# Patient Record
Sex: MALE | Race: OTHER | HISPANIC OR LATINO
[De-identification: names, ages, dates, MRNs, and addresses within clinical notes are randomized per-mention and may not be internally consistent; named-entity substitution may affect disease eponyms.]

---

## 2022-12-05 PROBLEM — Z00.00 ENCOUNTER FOR PREVENTIVE HEALTH EXAMINATION: Status: ACTIVE | Noted: 2022-12-05

## 2022-12-20 ENCOUNTER — APPOINTMENT (OUTPATIENT)
Dept: PAIN MANAGEMENT | Facility: CLINIC | Age: 67
End: 2022-12-20

## 2022-12-20 VITALS — WEIGHT: 225 LBS | BODY MASS INDEX: 35.31 KG/M2 | HEIGHT: 67 IN

## 2022-12-20 DIAGNOSIS — Z86.79 PERSONAL HISTORY OF OTHER DISEASES OF THE CIRCULATORY SYSTEM: ICD-10-CM

## 2022-12-20 DIAGNOSIS — Z78.9 OTHER SPECIFIED HEALTH STATUS: ICD-10-CM

## 2022-12-20 DIAGNOSIS — Z86.39 PERSONAL HISTORY OF OTHER ENDOCRINE, NUTRITIONAL AND METABOLIC DISEASE: ICD-10-CM

## 2022-12-20 DIAGNOSIS — Z87.891 PERSONAL HISTORY OF NICOTINE DEPENDENCE: ICD-10-CM

## 2022-12-20 PROCEDURE — 99072 ADDL SUPL MATRL&STAF TM PHE: CPT

## 2022-12-20 PROCEDURE — 96102W: CUSTOM

## 2022-12-20 PROCEDURE — 99214 OFFICE O/P EST MOD 30 MIN: CPT

## 2022-12-21 PROBLEM — Z86.39 HISTORY OF THYROID DISORDER: Status: RESOLVED | Noted: 2022-12-21 | Resolved: 2022-12-21

## 2022-12-21 PROBLEM — Z86.79 HISTORY OF HYPERTENSION: Status: RESOLVED | Noted: 2022-12-21 | Resolved: 2022-12-21

## 2022-12-21 PROBLEM — Z78.9 CURRENT NON-DRINKER OF ALCOHOL: Status: ACTIVE | Noted: 2022-12-21

## 2022-12-21 PROBLEM — Z78.9 DOES NOT USE ILLICIT DRUGS: Status: ACTIVE | Noted: 2022-12-21

## 2022-12-21 PROBLEM — Z87.891 FORMER SMOKER: Status: ACTIVE | Noted: 2022-12-21

## 2022-12-21 NOTE — ASSESSMENT
[FreeTextEntry1] : This is a 67 year old male presenting with lower back pain. Patient states that he is having severe back pain following a physical exam with another provider. I will send Ibuprofen-Famotidine to his pharmacy at his request. He will follow up in 6 weeks for reevaluation. All this patients questions were answered and the conversation was understood well.\par \par I, Nadine Dillard, attest that this documentation has been prepared under the direction and in the presence of Provider Mana Retana MD.\par \par \par Thank you for allowing me to assist in the management of this patient. \par \par \par Best Regards, \par \par \par Mana Retana M.D., FAAPMR\par \par \par Diplomate, American Board of Physical Medicine and Rehabilitation\par Diplomate, American Board of Pain Medicine \par Diplomate, American Board of Pain Management\par

## 2022-12-21 NOTE — WORK
[Other: ___] : [unfilled] [Was the competent medical cause of the injury] : was the competent medical cause of the injury [Are consistent with the injury] : are consistent with the injury [Consistent with my objective findings] : consistent with my objective findings [Moderate Partial] : moderate partial [Can return to work with limitations on ______] : can return to work with limitations on [unfilled] [Bending/Twisting] : bending/twisting [Climbing stairs/Ladders] : climbing stairs/ladders [Kneeling] : kneeling [Lifting] : lifting [Operating heavy equipment] : operating heavy equipment [Walking] : walking [Pulling/Pushing] : pulling/pushing [Reaching overhead] : reaching overhead [Unknown at this time] : : unknown at this time [Patient] : patient [No] : No [No Rx restrictions] : No Rx restrictions. [I provided the services listed above] :  I provided the services listed above. [FreeTextEntry1] : Fair [Sedentary Work:] : Sedentary Work: Exerting up to 10 pounds of force occasionally and/or a negligible amount of force frequently to lift, carry, push, pull or otherwise move objects, including the human body. Sedentary work involves sitting most of the time, but may involve walking or standing for brief periods of time. Jobs are sedentary if walking and standing are required only occasionally and all other sedentary criteria are met.

## 2022-12-21 NOTE — PHYSICAL EXAM
[Normal Coordination] : normal coordination [Normal DTR UE/LE] : normal DTR UE/LE  [Normal Sensation] : normal sensation [Normal Mood and Affect] : normal mood and affect [Orientated] : orientated [Able to Communicate] : able to communicate [Well Developed] : well developed [Well Nourished] : well nourished [FreeTextEntry9] : positive left facet loading and positive right facet loading.  [] : patient ambulates without assistive device [TWNoteComboBox7] : forward flexion 45 degrees [de-identified] : extension 20 degrees

## 2022-12-21 NOTE — DATA REVIEWED
[FreeTextEntry1] : SOAPP: low on 12/20/22\par Low risk: Patient has combination of a low risk SOAP and no risk factors. UDS would be repeated randomly every quarter.\par \par UDS: No data obtained today.\par \par [place] REGISTRY: Checked\par

## 2022-12-21 NOTE — HISTORY OF PRESENT ILLNESS
[FreeTextEntry1] : ORIGINAL PRESENTATION: Mr. Flores is a 67 year-old male complaining of neck back pain. Pain is precipitated by work related injury occurring on 02/09/2020. 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 He went to [facility] across the street from his work site where he was prescribed Naproxen and released. No imaging was obtained at that time. He states MRI's of the cervical and lumbar spine were later obtained however, not available for review today. He has been under the care of Dr. Chappell his neurosurgeon who has suggested surgical intervention however, patient is not ready to proceed with surgery at this time. With regards to the neck, pain is mainly localized, right greater than left. He notes difficulty sleeping at night due to extreme discomfort. With regards to the back, pain is localized. He denies any radicular features to the upper lower extremities, numbness tingling or upper lower extremity weakness. He has been attending physical therapy which did improve his symptoms however, his workers compensation insurance would not approve additional sessions. We discussed trialing epidural injections as an alternative however, he would like to remain conservative at this time and re-trial physical therapy. He has been managing with Naproxen with relief, but states it causes some gastric upset. Of note, he has not returned to work since the injury occurred and does not feel able to return at this time as his job is very physically demanding. Pain started after injury at work. The patient has had this pain for 14 months. Patient describes pain as moderate to severe, 8/10 at its worst, 7/10 currently, 6/10 at its best. During the last month the pain has been nearly constant with symptoms worsening in no typical pattern. Pain described as sharp, pressure-like, dull/aching. Pain is increased with back pain is increased with standing, walking, exercise. Pain is decreased with back pain is decreased with lying down, sitting. Pain is not changed with relaxation, bowel movements. Bowel or bladder habits have not changed.  \par \par ACTIVITIES: Patient could walk 10 blocks before the pain starts. Patient can sit 1 hour before pain starts. Patient can stand 30 minutes before pain starts. The patient sometimes lies down because of pain. Patient uses no assistive device at this time. Patient has difficulty going to work, performing household chores, doing outside work or shopping, socializing with friends, participating in recreational activities at this time.  \par \par PRIOR PAIN TREATMENTS: Moderate relief with physical therapy and heat treatment.  \par \par PRIOR PAIN MEDICATIONS: Tylenol, naproxen. \par \par PATIENT PRESENTS FOR FOLLOW UP:  Patient was last seen on 6/10/22 for cervical and lumbar pain. Since his last visit, he had a physical with an MIKE on 12/08/22 and states that they lifted his leg causing severe pain in his lower back pain. He was unable to walk or sit for long periods of time. He would like to continue with medication management since it provided him relief in the past. He would like to trial Ibuprofen-Famotidine over the Naproxen since it provided him better relief. \par \par

## 2022-12-21 NOTE — REASON FOR VISIT
[Follow-Up Visit] : a follow-up pain management visit [FreeTextEntry2] : refill/ having pain  neck and back

## 2023-01-08 ENCOUNTER — FORM ENCOUNTER (OUTPATIENT)
Age: 68
End: 2023-01-08

## 2023-02-03 ENCOUNTER — APPOINTMENT (OUTPATIENT)
Dept: PAIN MANAGEMENT | Facility: CLINIC | Age: 68
End: 2023-02-03
Payer: OTHER MISCELLANEOUS

## 2023-02-03 VITALS
SYSTOLIC BLOOD PRESSURE: 123 MMHG | HEIGHT: 67 IN | WEIGHT: 225 LBS | BODY MASS INDEX: 35.31 KG/M2 | HEART RATE: 85 BPM | DIASTOLIC BLOOD PRESSURE: 79 MMHG

## 2023-02-03 PROCEDURE — 99072 ADDL SUPL MATRL&STAF TM PHE: CPT

## 2023-02-03 PROCEDURE — 99213 OFFICE O/P EST LOW 20 MIN: CPT | Mod: ACP

## 2023-02-03 RX ORDER — NAPROXEN 500 MG/1
500 TABLET, DELAYED RELEASE ORAL
Qty: 28 | Refills: 0 | Status: DISCONTINUED | COMMUNITY
Start: 2022-12-09 | End: 2023-02-03

## 2023-02-03 NOTE — DISCUSSION/SUMMARY
[de-identified] : This is a 67 year old male presenting with cervical and lower back pain. He will continue with Ibuprofen-Famotidine. He will follow up in 2 months for reevaluation. \par \par Clarissa Casanova PA-C \par Mana Retana MD\par

## 2023-02-03 NOTE — HISTORY OF PRESENT ILLNESS
[FreeTextEntry1] : ORIGINAL PRESENTATION: Mr. Flores is a 67 year-old male complaining of neck back pain. Pain is precipitated by work related injury occurring on 02/09/2020. 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 He went to [facility] across the street from his work site where he was prescribed Naproxen and released. No imaging was obtained at that time. He states MRI's of the cervical and lumbar spine were later obtained however, not available for review today. He has been under the care of Dr. Chappell his neurosurgeon who has suggested surgical intervention however, patient is not ready to proceed with surgery at this time. With regards to the neck, pain is mainly localized, right greater than left. He notes difficulty sleeping at night due to extreme discomfort. With regards to the back, pain is localized. He denies any radicular features to the upper lower extremities, numbness tingling or upper lower extremity weakness. He has been attending physical therapy which did improve his symptoms however, his workers compensation insurance would not approve additional sessions. We discussed trialing epidural injections as an alternative however, he would like to remain conservative at this time and re-trial physical therapy. He has been managing with Naproxen with relief, but states it causes some gastric upset. Of note, he has not returned to work since the injury occurred and does not feel able to return at this time as his job is very physically demanding. Pain started after injury at work. The patient has had this pain for 14 months. Patient describes pain as moderate to severe, 8/10 at its worst, 7/10 currently, 6/10 at its best. During the last month the pain has been nearly constant with symptoms worsening in no typical pattern. Pain described as sharp, pressure-like, dull/aching. Pain is increased with back pain is increased with standing, walking, exercise. Pain is decreased with back pain is decreased with lying down, sitting. Pain is not changed with relaxation, bowel movements. Bowel or bladder habits have not changed.  \par \par ACTIVITIES: Patient could walk 10 blocks before the pain starts. Patient can sit 1 hour before pain starts. Patient can stand 30 minutes before pain starts. The patient sometimes lies down because of pain. Patient uses no assistive device at this time. Patient has difficulty going to work, performing household chores, doing outside work or shopping, socializing with friends, participating in recreational activities at this time.  \par \par PRIOR PAIN TREATMENTS: Moderate relief with physical therapy and heat treatment.  \par \par PRIOR PAIN MEDICATIONS: Tylenol, naproxen. \par \par TODAY: He is here for a revisit appointment. He continues with cervical and lumbar pain. He is unable to walk or sit for long periods of time. He cannot drive long distances. He has limited ROM in both his cervical and lumbar spine. He denies upper or lower extremity radiculopathy. PT has helped him in the past; however, additional PT sessions have been denied. He continues with medication management. He is taking Ibuprofen-Famotidine which helps him temporarily.\par \par

## 2023-02-03 NOTE — WORK
[Other: ___] : [unfilled] [Was the competent medical cause of the injury] : was the competent medical cause of the injury [Are consistent with the injury] : are consistent with the injury [Consistent with my objective findings] : consistent with my objective findings [Moderate Partial] : moderate partial [Can return to work with limitations on ______] : can return to work with limitations on [unfilled] [Bending/Twisting] : bending/twisting [Climbing stairs/Ladders] : climbing stairs/ladders [Kneeling] : kneeling [Lifting] : lifting [Operating heavy equipment] : operating heavy equipment [Walking] : walking [Pulling/Pushing] : pulling/pushing [Reaching overhead] : reaching overhead [Unknown at this time] : : unknown at this time [Patient] : patient [No] : No [No Rx restrictions] : No Rx restrictions. [I provided the services listed above] :  I provided the services listed above. [Sedentary Work:] : Sedentary Work: Exerting up to 10 pounds of force occasionally and/or a negligible amount of force frequently to lift, carry, push, pull or otherwise move objects, including the human body. Sedentary work involves sitting most of the time, but may involve walking or standing for brief periods of time. Jobs are sedentary if walking and standing are required only occasionally and all other sedentary criteria are met. [FreeTextEntry1] : Fair

## 2023-02-03 NOTE — PHYSICAL EXAM
[Normal Coordination] : normal coordination [Normal DTR UE/LE] : normal DTR UE/LE  [Normal Sensation] : normal sensation [Normal Mood and Affect] : normal mood and affect [Orientated] : orientated [Able to Communicate] : able to communicate [Well Developed] : well developed [Well Nourished] : well nourished [FreeTextEntry9] : positive left facet loading and positive right facet loading.  [] : no lumbar paraspinal spasm [TWNoteComboBox7] : forward flexion 45 degrees [de-identified] : extension 20 degrees

## 2023-04-19 ENCOUNTER — APPOINTMENT (OUTPATIENT)
Dept: PAIN MANAGEMENT | Facility: CLINIC | Age: 68
End: 2023-04-19
Payer: OTHER MISCELLANEOUS

## 2023-04-19 VITALS — WEIGHT: 218 LBS | HEIGHT: 67 IN | BODY MASS INDEX: 34.21 KG/M2

## 2023-04-19 PROCEDURE — 99213 OFFICE O/P EST LOW 20 MIN: CPT | Mod: ACP

## 2023-04-19 NOTE — REASON FOR VISIT
[Follow-Up Visit] : a follow-up pain management visit [FreeTextEntry2] : FOLLOW UP FOR NECK AND BACK PAIN

## 2023-04-19 NOTE — PHYSICAL EXAM
[Normal Coordination] : normal coordination [Normal DTR UE/LE] : normal DTR UE/LE  [Normal Sensation] : normal sensation [Normal Mood and Affect] : normal mood and affect [Orientated] : orientated [Able to Communicate] : able to communicate [Well Developed] : well developed [Well Nourished] : well nourished [FreeTextEntry9] : positive left facet loading and positive right facet loading.  [] : patient ambulates without assistive device [TWNoteComboBox7] : forward flexion 45 degrees [de-identified] : extension 20 degrees

## 2023-04-19 NOTE — HISTORY OF PRESENT ILLNESS
[FreeTextEntry1] : ORIGINAL PRESENTATION: Mr. Flores is a 67 year-old male complaining of neck back pain. Pain is precipitated by work related injury occurring on 02/09/2020. 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 He went to [facility] across the street from his work site where he was prescribed Naproxen and released. No imaging was obtained at that time. He states MRI's of the cervical and lumbar spine were later obtained however, not available for review today. He has been under the care of Dr. Chappell his neurosurgeon who has suggested surgical intervention however, patient is not ready to proceed with surgery at this time. With regards to the neck, pain is mainly localized, right greater than left. He notes difficulty sleeping at night due to extreme discomfort. With regards to the back, pain is localized. He denies any radicular features to the upper lower extremities, numbness tingling or upper lower extremity weakness. He has been attending physical therapy which did improve his symptoms however, his workers compensation insurance would not approve additional sessions. We discussed trialing epidural injections as an alternative however, he would like to remain conservative at this time and re-trial physical therapy. He has been managing with Naproxen with relief, but states it causes some gastric upset. Of note, he has not returned to work since the injury occurred and does not feel able to return at this time as his job is very physically demanding. Pain started after injury at work. The patient has had this pain for 14 months. Patient describes pain as moderate to severe, 8/10 at its worst, 7/10 currently, 6/10 at its best. During the last month the pain has been nearly constant with symptoms worsening in no typical pattern. Pain described as sharp, pressure-like, dull/aching. Pain is increased with back pain is increased with standing, walking, exercise. Pain is decreased with back pain is decreased with lying down, sitting. Pain is not changed with relaxation, bowel movements. Bowel or bladder habits have not changed.  \par \par ACTIVITIES: Patient could walk 10 blocks before the pain starts. Patient can sit 1 hour before pain starts. Patient can stand 30 minutes before pain starts. The patient sometimes lies down because of pain. Patient uses no assistive device at this time. Patient has difficulty going to work, performing household chores, doing outside work or shopping, socializing with friends, participating in recreational activities at this time.  \par \par PRIOR PAIN TREATMENTS: Moderate relief with physical therapy and heat treatment.  \par \par PRIOR PAIN MEDICATIONS: Tylenol, naproxen. \par \par TODAY: He is here for a revisit appointment. He continues with cervical and lumbar pain. His pain has been unchanged since his last visit. He states he has not had any acute flares or exacerbations in the last 2 months. He continues to have difficulty ambulating for long periods of time. He cannot drive long distances. He has limited ROM in both his cervical and lumbar spine. He denies upper or lower extremity radiculopathy. He is stable on a regimen of Ibuprofen-Famotidine which provides him with over 50% relief when the medication is consumed. He wishes to continue as is without change.\par \par

## 2023-04-19 NOTE — HISTORY OF PRESENT ILLNESS
[FreeTextEntry1] : ORIGINAL PRESENTATION: Mr. Flores is a 67 year-old male complaining of neck back pain. Pain is precipitated by work related injury occurring on 02/09/2020. 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 He went to [facility] across the street from his work site where he was prescribed Naproxen and released. No imaging was obtained at that time. He states MRI's of the cervical and lumbar spine were later obtained however, not available for review today. He has been under the care of Dr. Chappell his neurosurgeon who has suggested surgical intervention however, patient is not ready to proceed with surgery at this time. With regards to the neck, pain is mainly localized, right greater than left. He notes difficulty sleeping at night due to extreme discomfort. With regards to the back, pain is localized. He denies any radicular features to the upper lower extremities, numbness tingling or upper lower extremity weakness. He has been attending physical therapy which did improve his symptoms however, his workers compensation insurance would not approve additional sessions. We discussed trialing epidural injections as an alternative however, he would like to remain conservative at this time and re-trial physical therapy. He has been managing with Naproxen with relief, but states it causes some gastric upset. Of note, he has not returned to work since the injury occurred and does not feel able to return at this time as his job is very physically demanding. Pain started after injury at work. The patient has had this pain for 14 months. Patient describes pain as moderate to severe, 8/10 at its worst, 7/10 currently, 6/10 at its best. During the last month the pain has been nearly constant with symptoms worsening in no typical pattern. Pain described as sharp, pressure-like, dull/aching. Pain is increased with back pain is increased with standing, walking, exercise. Pain is decreased with back pain is decreased with lying down, sitting. Pain is not changed with relaxation, bowel movements. Bowel or bladder habits have not changed.  \par \par ACTIVITIES: Patient could walk 10 blocks before the pain starts. Patient can sit 1 hour before pain starts. Patient can stand 30 minutes before pain starts. The patient sometimes lies down because of pain. Patient uses no assistive device at this time. Patient has difficulty going to work, performing household chores, doing outside work or shopping, socializing with friends, participating in recreational activities at this time.  \par \par PRIOR PAIN TREATMENTS: Moderate relief with physical therapy and heat treatment.  \par \par PRIOR PAIN MEDICATIONS: Tylenol, naproxen. \par \par TODAY: He is here for a revisit appointment. He continues with cervical and lumbar pain. His pain has been unchanged since his last visit. He states he has not had any acute flares or exacerbations in the last 2 months. He continues to have difficulty ambulating for long periods of time. He cannot drive long distances. He has limited ROM in both his cervical and lumbar spine. He denies upper or lower extremity radiculopathy. He is stable on a regimen of Ibuprofen-Famotidine which provides him with over 50% relief when the medication is consumed. He wishes to continue as is without change.\par \par

## 2023-04-19 NOTE — PHYSICAL EXAM
[Normal Coordination] : normal coordination [Normal DTR UE/LE] : normal DTR UE/LE  [Normal Sensation] : normal sensation [Normal Mood and Affect] : normal mood and affect [Orientated] : orientated [Able to Communicate] : able to communicate [Well Developed] : well developed [Well Nourished] : well nourished [FreeTextEntry9] : positive left facet loading and positive right facet loading.  [] : patient ambulates without assistive device [TWNoteComboBox7] : forward flexion 45 degrees [de-identified] : extension 20 degrees

## 2023-04-19 NOTE — ASSESSMENT
[FreeTextEntry1] : This is a 68 year old male presenting with cervical and lower back pain. He is on a medication regimen of Ibuprofen-Famotidine with excellent relief. He will follow up in 3 months for further relief. \par \par Overall there is at least a 30-50% reduction in pain with the prescribed analgesics. The patient denies any adverse side effects due to the medication (sleeping disturbance, constipation, sleepiness, hallucinations and/or urination problems).\par \par Jn Del Rosario PA-C\par Mana Retana MD\par

## 2023-06-20 ENCOUNTER — RX RENEWAL (OUTPATIENT)
Age: 68
End: 2023-06-20

## 2023-07-19 ENCOUNTER — APPOINTMENT (OUTPATIENT)
Dept: PAIN MANAGEMENT | Facility: CLINIC | Age: 68
End: 2023-07-19

## 2024-01-02 ENCOUNTER — APPOINTMENT (OUTPATIENT)
Dept: PAIN MANAGEMENT | Facility: CLINIC | Age: 69
End: 2024-01-02
Payer: OTHER MISCELLANEOUS

## 2024-01-02 VITALS — BODY MASS INDEX: 34.21 KG/M2 | HEIGHT: 67 IN | WEIGHT: 218 LBS

## 2024-01-02 PROCEDURE — 99214 OFFICE O/P EST MOD 30 MIN: CPT | Mod: ACP

## 2024-01-02 NOTE — PHYSICAL EXAM
[Normal Coordination] : normal coordination [Normal DTR UE/LE] : normal DTR UE/LE  [Normal Sensation] : normal sensation [Normal Mood and Affect] : normal mood and affect [Orientated] : orientated [Able to Communicate] : able to communicate [Well Developed] : well developed [Well Nourished] : well nourished [FreeTextEntry9] : positive left facet loading and positive right facet loading.  [] : no lumbar paraspinal spasm [TWNoteComboBox7] : forward flexion 45 degrees [de-identified] : extension 20 degrees

## 2024-01-02 NOTE — HISTORY OF PRESENT ILLNESS
[FreeTextEntry1] : ORIGINAL PRESENTATION: Mr. Flores is a 68 year-old male complaining of neck back pain. Pain is precipitated by work related injury occurring on 02/09/2020. 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 He went to [facility] across the street from his work site where he was prescribed Naproxen and released. No imaging was obtained at that time. He states MRI's of the cervical and lumbar spine were later obtained however, not available for review today. He has been under the care of Dr. Chappell his neurosurgeon who has suggested surgical intervention however, patient is not ready to proceed with surgery at this time. With regards to the neck, pain is mainly localized, right greater than left. He notes difficulty sleeping at night due to extreme discomfort. With regards to the back, pain is localized. He denies any radicular features to the upper lower extremities, numbness tingling or upper lower extremity weakness. He has been attending physical therapy which did improve his symptoms however, his workers compensation insurance would not approve additional sessions. We discussed trialing epidural injections as an alternative however, he would like to remain conservative at this time and re-trial physical therapy. He has been managing with Naproxen with relief, but states it causes some gastric upset. Of note, he has not returned to work since the injury occurred and does not feel able to return at this time as his job is very physically demanding. Pain started after injury at work. The patient has had this pain for 14 months. Patient describes pain as moderate to severe, 8/10 at its worst, 7/10 currently, 6/10 at its best. During the last month the pain has been nearly constant with symptoms worsening in no typical pattern. Pain described as sharp, pressure-like, dull/aching. Pain is increased with back pain is increased with standing, walking, exercise. Pain is decreased with back pain is decreased with lying down, sitting. Pain is not changed with relaxation, bowel movements. Bowel or bladder habits have not changed.    ACTIVITIES: Patient could walk 10 blocks before the pain starts. Patient can sit 1 hour before pain starts. Patient can stand 30 minutes before pain starts. The patient sometimes lies down because of pain. Patient uses no assistive device at this time. Patient has difficulty going to work, performing household chores, doing outside work or shopping, socializing with friends, participating in recreational activities at this time.    PRIOR PAIN TREATMENTS: Moderate relief with physical therapy and heat treatment.    PRIOR PAIN MEDICATIONS: Tylenol, naproxen.   TODAY: He is here for a routine follow up. He continues with cervical and lumbar pain. His pain has been unchanged since his last visit in April 2023. He states he has not had any acute flares or exacerbations. He continues to have difficulty ambulating for long periods of time. He cannot drive long distances. He has limited ROM in both his cervical and lumbar spine. He denies upper or lower extremity radiculopathy. He has undergone PT in the past with no relief. He has not wanted to undergo injection treatments. He is stable on a regimen of Ibuprofen-Famotidine which provides him with over 50% relief when the medication is consumed. He wishes to continue as is without change.

## 2024-01-02 NOTE — DISCUSSION/SUMMARY
[de-identified] : This is a 68 year old male presenting with cervical and lower back pain. He is on a medication regimen of Ibuprofen-Famotidine with excellent relief. He wishes to continue as is. The medication was refilled for him today. He will follow up in 2-3 months for reevaluation.  VILMA Rivas MD

## 2024-02-01 ENCOUNTER — RX CHANGE (OUTPATIENT)
Age: 69
End: 2024-02-01

## 2024-03-05 ENCOUNTER — APPOINTMENT (OUTPATIENT)
Dept: PAIN MANAGEMENT | Facility: CLINIC | Age: 69
End: 2024-03-05
Payer: OTHER MISCELLANEOUS

## 2024-03-05 DIAGNOSIS — M54.2 CERVICALGIA: ICD-10-CM

## 2024-03-05 DIAGNOSIS — M54.50 LOW BACK PAIN, UNSPECIFIED: ICD-10-CM

## 2024-03-05 PROCEDURE — 99214 OFFICE O/P EST MOD 30 MIN: CPT | Mod: ACP

## 2024-03-05 RX ORDER — IBUPROFEN AND FAMOTIDINE 26.6; 8 MG/1; MG/1
800-26.6 TABLET, COATED ORAL
Qty: 90 | Refills: 1 | Status: ACTIVE | COMMUNITY
Start: 2022-12-20 | End: 1900-01-01

## 2024-03-05 NOTE — HISTORY OF PRESENT ILLNESS
[FreeTextEntry1] : ORIGINAL PRESENTATION: Mr. Flores is a 68 year-old male complaining of neck back pain. Pain is precipitated by work related injury occurring on 02/09/2020. 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 He went to [facility] across the street from his work site where he was prescribed Naproxen and released. No imaging was obtained at that time. He states MRI's of the cervical and lumbar spine were later obtained however, not available for review today. He has been under the care of Dr. Chappell his neurosurgeon who has suggested surgical intervention however, patient is not ready to proceed with surgery at this time. With regards to the neck, pain is mainly localized, right greater than left. He notes difficulty sleeping at night due to extreme discomfort. With regards to the back, pain is localized. He denies any radicular features to the upper lower extremities, numbness tingling or upper lower extremity weakness. He has been attending physical therapy which did improve his symptoms however, his workers compensation insurance would not approve additional sessions. We discussed trialing epidural injections as an alternative however, he would like to remain conservative at this time and re-trial physical therapy. He has been managing with Naproxen with relief, but states it causes some gastric upset. Of note, he has not returned to work since the injury occurred and does not feel able to return at this time as his job is very physically demanding. Pain started after injury at work. The patient has had this pain for 14 months. Patient describes pain as moderate to severe, 8/10 at its worst, 7/10 currently, 6/10 at its best. During the last month the pain has been nearly constant with symptoms worsening in no typical pattern. Pain described as sharp, pressure-like, dull/aching. Pain is increased with back pain is increased with standing, walking, exercise. Pain is decreased with back pain is decreased with lying down, sitting. Pain is not changed with relaxation, bowel movements. Bowel or bladder habits have not changed.    ACTIVITIES: Patient could walk 10 blocks before the pain starts. Patient can sit 1 hour before pain starts. Patient can stand 30 minutes before pain starts. The patient sometimes lies down because of pain. Patient uses no assistive device at this time. Patient has difficulty going to work, performing household chores, doing outside work or shopping, socializing with friends, participating in recreational activities at this time.    PRIOR PAIN TREATMENTS: Moderate relief with physical therapy and heat treatment.    PRIOR PAIN MEDICATIONS: Tylenol, naproxen.   TODAY:  Pt is a 68-year-old male who is here today for a follow up, he is under our care with . There are no new complaints or acute changes.  He is under our care for cervical and lumbar pain. His pain has been unchanged since his last visit in Jan 2024. He continues to have difficulty ambulating for long periods of time. He cannot drive long distances. He has limited ROM in both his cervical and lumbar spine. He denies upper or lower extremity radiculopathy.  He has trialed PT in the past, which he states gave him mild to almost no relief.  hadn't approved additional sessions. He has not wanted to undergo injection treatments.  He is stable on a regimen of Ibuprofen-Famotidine which provides him with over 50% relief when the medication is consumed. He wishes to continue as is without change.

## 2024-03-05 NOTE — ASSESSMENT
[FreeTextEntry1] : This is a 68 year old male presenting with cervical and lower back pain. He is on a medication regimen of Ibuprofen-Famotidine with excellent relief. He wishes to continue as is. The medication was refilled for him today. He will follow up in 2 months for reevaluation.  VILMA Rivas MD

## 2024-03-05 NOTE — PHYSICAL EXAM
[Normal Coordination] : normal coordination [Normal DTR UE/LE] : normal DTR UE/LE  [Normal Sensation] : normal sensation [Normal Mood and Affect] : normal mood and affect [Oriented] : oriented [Able to Communicate] : able to communicate [Well Developed] : well developed [Well Nourished] : well nourished [FreeTextEntry9] : positive left facet loading and positive right facet loading.  [] : no lumbar paraspinal spasm [TWNoteComboBox7] : forward flexion 45 degrees [de-identified] : extension 20 degrees

## 2024-03-05 NOTE — REASON FOR VISIT
[Follow-Up Visit] : a follow-up pain management visit [FreeTextEntry2] : FOLLOW UP FOR NECK AND BACK PAIN - MEDS REFILL

## 2024-03-05 NOTE — WORK
[Other: ___] : [unfilled] [Was the competent medical cause of the injury] : was the competent medical cause of the injury [Are consistent with the injury] : are consistent with the injury [Consistent with my objective findings] : consistent with my objective findings [Moderate Partial] : moderate partial [Can return to work with limitations on ______] : can return to work with limitations on [unfilled] [Climbing stairs/Ladders] : climbing stairs/ladders [Bending/Twisting] : bending/twisting [Kneeling] : kneeling [Operating heavy equipment] : operating heavy equipment [Lifting] : lifting [Pulling/Pushing] : pulling/pushing [Walking] : walking [Reaching overhead] : reaching overhead [Unknown at this time] : : unknown at this time [Patient] : patient [No] : No [No Rx restrictions] : No Rx restrictions. [I provided the services listed above] :  I provided the services listed above. [Sedentary Work:] : Sedentary Work: Exerting up to 10 pounds of force occasionally and/or a negligible amount of force frequently to lift, carry, push, pull or otherwise move objects, including the human body. Sedentary work involves sitting most of the time, but may involve walking or standing for brief periods of time. Jobs are sedentary if walking and standing are required only occasionally and all other sedentary criteria are met. [FreeTextEntry1] : Fair

## 2024-05-07 ENCOUNTER — APPOINTMENT (OUTPATIENT)
Dept: PAIN MANAGEMENT | Facility: CLINIC | Age: 69
End: 2024-05-07

## 2024-10-24 ENCOUNTER — APPOINTMENT (OUTPATIENT)
Dept: PAIN MANAGEMENT | Facility: CLINIC | Age: 69
End: 2024-10-24
Payer: OTHER MISCELLANEOUS

## 2024-10-24 DIAGNOSIS — M54.50 LOW BACK PAIN, UNSPECIFIED: ICD-10-CM

## 2024-10-24 DIAGNOSIS — M54.2 CERVICALGIA: ICD-10-CM

## 2024-10-24 PROCEDURE — 99214 OFFICE O/P EST MOD 30 MIN: CPT

## 2024-11-20 ENCOUNTER — APPOINTMENT (OUTPATIENT)
Dept: PULMONOLOGY | Facility: CLINIC | Age: 69
End: 2024-11-20
Payer: MEDICARE

## 2024-11-20 VITALS
OXYGEN SATURATION: 97 % | SYSTOLIC BLOOD PRESSURE: 126 MMHG | DIASTOLIC BLOOD PRESSURE: 72 MMHG | BODY MASS INDEX: 31.71 KG/M2 | HEIGHT: 67 IN | RESPIRATION RATE: 14 BRPM | WEIGHT: 202 LBS | HEART RATE: 74 BPM

## 2024-11-20 DIAGNOSIS — Z87.891 PERSONAL HISTORY OF NICOTINE DEPENDENCE: ICD-10-CM

## 2024-11-20 DIAGNOSIS — Z86.39 PERSONAL HISTORY OF OTHER ENDOCRINE, NUTRITIONAL AND METABOLIC DISEASE: ICD-10-CM

## 2024-11-20 DIAGNOSIS — G47.33 OBSTRUCTIVE SLEEP APNEA (ADULT) (PEDIATRIC): ICD-10-CM

## 2024-11-20 DIAGNOSIS — Z86.79 PERSONAL HISTORY OF OTHER DISEASES OF THE CIRCULATORY SYSTEM: ICD-10-CM

## 2024-11-20 DIAGNOSIS — E66.9 OBESITY, UNSPECIFIED: ICD-10-CM

## 2024-11-20 DIAGNOSIS — J45.909 UNSPECIFIED ASTHMA, UNCOMPLICATED: ICD-10-CM

## 2024-11-20 PROCEDURE — 99204 OFFICE O/P NEW MOD 45 MIN: CPT

## 2024-11-20 PROCEDURE — G2211 COMPLEX E/M VISIT ADD ON: CPT

## 2024-12-02 ENCOUNTER — APPOINTMENT (OUTPATIENT)
Dept: PULMONOLOGY | Facility: HOSPITAL | Age: 69
End: 2024-12-02

## 2024-12-02 ENCOUNTER — OUTPATIENT (OUTPATIENT)
Dept: OUTPATIENT SERVICES | Facility: HOSPITAL | Age: 69
LOS: 1 days | End: 2024-12-02
Payer: MEDICARE

## 2024-12-02 DIAGNOSIS — R06.02 SHORTNESS OF BREATH: ICD-10-CM

## 2024-12-02 PROCEDURE — 94729 DIFFUSING CAPACITY: CPT | Mod: 26

## 2024-12-02 PROCEDURE — 94060 EVALUATION OF WHEEZING: CPT | Mod: 26

## 2024-12-02 PROCEDURE — 94727 GAS DIL/WSHOT DETER LNG VOL: CPT | Mod: 26

## 2024-12-02 PROCEDURE — 94727 GAS DIL/WSHOT DETER LNG VOL: CPT

## 2024-12-02 PROCEDURE — 94729 DIFFUSING CAPACITY: CPT

## 2024-12-02 PROCEDURE — 94664 DEMO&/EVAL PT USE INHALER: CPT

## 2024-12-02 PROCEDURE — 94070 EVALUATION OF WHEEZING: CPT

## 2024-12-03 DIAGNOSIS — R06.02 SHORTNESS OF BREATH: ICD-10-CM

## 2024-12-31 ENCOUNTER — NON-APPOINTMENT (OUTPATIENT)
Age: 69
End: 2024-12-31

## 2024-12-31 ENCOUNTER — LABORATORY RESULT (OUTPATIENT)
Age: 69
End: 2024-12-31

## 2025-01-17 ENCOUNTER — APPOINTMENT (OUTPATIENT)
Dept: PULMONOLOGY | Facility: CLINIC | Age: 70
End: 2025-01-17
Payer: MEDICARE

## 2025-01-17 VITALS
DIASTOLIC BLOOD PRESSURE: 68 MMHG | HEIGHT: 67 IN | SYSTOLIC BLOOD PRESSURE: 114 MMHG | OXYGEN SATURATION: 96 % | HEART RATE: 93 BPM | BODY MASS INDEX: 30.29 KG/M2 | WEIGHT: 193 LBS

## 2025-01-17 DIAGNOSIS — R05.3 CHRONIC COUGH: ICD-10-CM

## 2025-01-17 DIAGNOSIS — E66.9 OBESITY, UNSPECIFIED: ICD-10-CM

## 2025-01-17 DIAGNOSIS — G47.33 OBSTRUCTIVE SLEEP APNEA (ADULT) (PEDIATRIC): ICD-10-CM

## 2025-01-17 DIAGNOSIS — R06.02 SHORTNESS OF BREATH: ICD-10-CM

## 2025-01-17 DIAGNOSIS — J45.909 UNSPECIFIED ASTHMA, UNCOMPLICATED: ICD-10-CM

## 2025-01-17 PROCEDURE — 99214 OFFICE O/P EST MOD 30 MIN: CPT

## 2025-01-17 PROCEDURE — G2211 COMPLEX E/M VISIT ADD ON: CPT

## 2025-01-17 RX ORDER — METOPROLOL SUCCINATE 25 MG/1
25 TABLET, EXTENDED RELEASE ORAL
Refills: 0 | Status: ACTIVE | COMMUNITY

## 2025-01-17 RX ORDER — FAMOTIDINE 20 MG/1
20 TABLET, FILM COATED ORAL
Qty: 30 | Refills: 1 | Status: ACTIVE | COMMUNITY
Start: 2025-01-17 | End: 1900-01-01

## 2025-01-17 RX ORDER — ROSUVASTATIN CALCIUM 5 MG/1
5 TABLET, FILM COATED ORAL
Refills: 0 | Status: ACTIVE | COMMUNITY

## 2025-01-17 RX ORDER — MONTELUKAST 10 MG/1
10 TABLET, FILM COATED ORAL
Qty: 30 | Refills: 2 | Status: ACTIVE | COMMUNITY
Start: 2025-01-17 | End: 1900-01-01

## 2025-01-17 RX ORDER — CLOPIDOGREL 75 MG/1
75 TABLET, FILM COATED ORAL
Refills: 0 | Status: ACTIVE | COMMUNITY

## 2025-01-17 RX ORDER — AZELASTINE HYDROCHLORIDE 137 UG/1
0.1 SPRAY, METERED NASAL
Qty: 1 | Refills: 3 | Status: ACTIVE | COMMUNITY
Start: 2025-01-17 | End: 1900-01-01

## 2025-01-24 ENCOUNTER — APPOINTMENT (OUTPATIENT)
Dept: PAIN MANAGEMENT | Facility: CLINIC | Age: 70
End: 2025-01-24
Payer: OTHER MISCELLANEOUS

## 2025-01-24 DIAGNOSIS — M54.2 CERVICALGIA: ICD-10-CM

## 2025-01-24 DIAGNOSIS — M54.50 LOW BACK PAIN, UNSPECIFIED: ICD-10-CM

## 2025-01-24 PROCEDURE — 99214 OFFICE O/P EST MOD 30 MIN: CPT | Mod: ACP

## 2025-02-18 ENCOUNTER — OUTPATIENT (OUTPATIENT)
Dept: OUTPATIENT SERVICES | Facility: HOSPITAL | Age: 70
LOS: 1 days | End: 2025-02-18
Payer: MEDICARE

## 2025-02-18 ENCOUNTER — RESULT REVIEW (OUTPATIENT)
Age: 70
End: 2025-02-18

## 2025-02-18 DIAGNOSIS — R05.3 CHRONIC COUGH: ICD-10-CM

## 2025-02-18 DIAGNOSIS — Z00.8 ENCOUNTER FOR OTHER GENERAL EXAMINATION: ICD-10-CM

## 2025-02-18 PROCEDURE — 71250 CT THORAX DX C-: CPT

## 2025-02-18 PROCEDURE — 71250 CT THORAX DX C-: CPT | Mod: 26

## 2025-02-19 DIAGNOSIS — R05.3 CHRONIC COUGH: ICD-10-CM

## 2025-02-24 VITALS
HEART RATE: 94 BPM | TEMPERATURE: 98 F | DIASTOLIC BLOOD PRESSURE: 83 MMHG | HEIGHT: 67 IN | SYSTOLIC BLOOD PRESSURE: 130 MMHG | RESPIRATION RATE: 16 BRPM | OXYGEN SATURATION: 98 % | WEIGHT: 195.11 LBS

## 2025-02-24 NOTE — H&P ADULT - ASSESSMENT
70 yo M with PMHx of HTN, HLD, hypothyroidism, BPH, CAD, VANITA (on CPAP), DM2 w/ PVD, Asthma/Reactive Airway Disease and prior DVT of RLE (last duplex 7/24 neg for DVT) whom in light of pt's risk factors, CCS class III anginal symptoms and abnormal CCTA, pt now presents to [facility] for recommended cardiac catheterization with possible intervention if clinically indicated.    EKG: NSR			  ASA: 2 Mallampati class: 3  Anginal Class: 3    -No Known Allergies    -H/H = 13.1/40.7  . Pt denies BRBPR, hematuria, hematochezia, melena. Pt endorses compliance w/ home plavix, stating last dose was today plavix 75 mg QD. Pt has intolerance to aspirin, had headache/CP previously, will hold off and discuss load w/ fellow.  -BUN/Cr = 18/0.82  . EF normal. Euvolemic on exam. IV NS @ 250 bolus followed by 75 cc/hr x 2 hrs started pre procedure    Sedation Plan:   Moderate  Patient Is Suitable Candidate For Sedation?     Yes    Risks & benefits of procedure and alternative therapy have been explained to the patient including but not limited to: allergic reaction, bleeding with possible need for blood transfusion, infection, renal and vascular compromise, limb damage, arrhythmia, stroke, vessel dissection/perforation, myocardial infarction, and emergent CABG. Informed consent obtained at bedside and included in chart. 68 yo M with PMHx of HTN, HLD, hypothyroidism, BPH, CAD, VANITA (on CPAP), DM2 w/ PVD, Asthma/Reactive Airway Disease and prior DVT of RLE (last duplex 7/24 neg for DVT) whom in light of pt's risk factors, CCS class III anginal symptoms and abnormal CCTA, pt now presents to [facility] for recommended cardiac catheterization with possible intervention if clinically indicated.    EKG: NSR			  ASA: 2 Mallampati class: 3  Anginal Class: 3    -No Known Allergies    -H/H = 13.1/40.7  . Pt denies BRBPR, hematuria, hematochezia, melena. Pt endorses compliance w/ home plavix, stating last dose was today plavix 75 mg QD. Pt has intolerance to aspirin, had headache/CP previously, will hold off and discuss load w/ fellow.  -BUN/Cr = 18/0.82  . EF normal. Euvolemic on exam. IV NS @ 250 bolus followed by 75 cc/hr x 2 hrs started pre procedure    Pt has extensive prior surgery w/ R wrist area involving radial artery, advised L radial access/groin.    Sedation Plan:   Moderate  Patient Is Suitable Candidate For Sedation?     Yes    Risks & benefits of procedure and alternative therapy have been explained to the patient including but not limited to: allergic reaction, bleeding with possible need for blood transfusion, infection, renal and vascular compromise, limb damage, arrhythmia, stroke, vessel dissection/perforation, myocardial infarction, and emergent CABG. Informed consent obtained at bedside and included in chart.

## 2025-02-24 NOTE — H&P ADULT - NSICDXPASTMEDICALHX_GEN_ALL_CORE_FT
PAST MEDICAL HISTORY:  HLD (hyperlipidemia)     HTN (hypertension)     Hypothyroidism     Type 2 diabetes mellitus

## 2025-02-24 NOTE — H&P ADULT - NSHPLABSRESULTS_GEN_ALL_CORE
LABS:                          13.1   8.64  )-----------( 122      ( 26 Feb 2025 08:09 )             40.7     02-26    138  |  102  |  18  ----------------------------<  120[H]  See note   |  23  |  0.82    Ca    8.8      26 Feb 2025 08:09  Mg     2.1     02-26    TPro  7.8  /  Alb  4.3  /  TBili  0.4  /  DBili  x   /  AST  See note  /  ALT  See note  /  AlkPhos  See note  02-26    LIVER FUNCTIONS - ( 26 Feb 2025 08:09 )  Alb: 4.3 g/dL / Pro: 7.8 g/dL / ALK PHOS: See note U/L / ALT: See note U/L / AST: See note U/L / GGT: x           PT/INR - ( 26 Feb 2025 08:09 )   PT: 12.2 sec;   INR: 1.04          PTT - ( 26 Feb 2025 08:09 )  PTT:29.3 sec  Urinalysis Basic - ( 26 Feb 2025 08:09 )    Color: x / Appearance: x / SG: x / pH: x  Gluc: 120 mg/dL / Ketone: x  / Bili: x / Urobili: x   Blood: x / Protein: x / Nitrite: x   Leuk Esterase: x / RBC: x / WBC x   Sq Epi: x / Non Sq Epi: x / Bacteria: x

## 2025-02-24 NOTE — H&P ADULT - HISTORY OF PRESENT ILLNESS
Cardiologist: Dr. Kapadia  Pharmacy:   Escort:     70 yo M with PMHx of HTN, HLD, hypothyroidism, BPH, CAD, VANITA (___ on CPAP), DM2 w/ PVD, and prior DVT of RLE (last duplex 7/24 neg for DVT) who presented to outpt cardiologist for follow up. Patient complains of chest pain and dizziness ongoing for the past ______, relived with rest. Denies any ___  palpitations, syncope, diaphoresis, fatigue, LE edema, SOB, SOLOMON, orthopnea, PND, N/V/D, abd pain, cough, congestion, fever, chills or recent sick contact.    TTE 5/14/24: Normal LV size and fctn. Minimal segmental wall motion abnormalities G1DD. Upper limit of normal RV size w normal fctn. Suggest of small arterial shunt by color doppler.   CCTA 12/26/24: Ca 1947. Right coronary dominant with extensive calcified coronary artery plaque, 3 vessel distribution. Probable mod-severe 60-80% stenosis in mLAD, mild stenosis LCx and PDA.   Lexiscan stress 12/27/23: No significant ST-T changes during or post Lexiscan infusion.     In light of pts risk factors, CCS class __ anginal symptoms and abnormal ____, pt now presents to [facility] for recommended cardiac catheterization with possible intervention if clinically indicated. Cardiologist: Dr. Kapadia  Pharmacy: [facility]  Escort: Daughter Gia    68 yo M with PMHx of HTN, HLD, hypothyroidism, BPH, CAD, VANITA (on CPAP), DM2 w/ PVD, Asthma/Reactive Airway Disease and prior DVT of RLE (last duplex 7/24 neg for DVT) who presented to outpatient cardiologist for follow up. Patient complains of chest pain and dizziness ongoing for the past month, relieved with rest. Denies any palpitations, syncope, diaphoresis, fatigue, LE edema, SOB, SOLOMON, orthopnea, PND, N/V/D, abd pain, congestion, fever, chills or recent sick contact. Pt has a chronic cough was started on abx empirically for PNA at urgent care, has two days left of antibiotics and undergoing extensive pulmonology workup.    TTE 5/14/24: Normal LV size and fctn. Minimal segmental wall motion abnormalities G1DD. Upper limit of normal RV size w normal fctn. Suggest of small arterial shunt by color doppler.   CCTA 12/26/24: Ca 1947. Right coronary dominant with extensive calcified coronary artery plaque, 3 vessel distribution. Probable mod-severe 60-80% stenosis in mLAD, mild stenosis LCx and PDA.   Lexiscan stress 12/27/23: No significant ST-T changes during or post Lexiscan infusion.     In light of pts risk factors, CCS class III anginal symptoms and abnormal CCTA, pt now presents to [facility] for recommended cardiac catheterization with possible intervention if clinically indicated.

## 2025-02-26 ENCOUNTER — OUTPATIENT (OUTPATIENT)
Dept: OUTPATIENT SERVICES | Facility: HOSPITAL | Age: 70
LOS: 1 days | Discharge: HOME | End: 2025-02-26
Payer: MEDICARE

## 2025-02-26 LAB
A1C WITH ESTIMATED AVERAGE GLUCOSE RESULT: 6.6 % — HIGH (ref 4–5.6)
ALBUMIN SERPL ELPH-MCNC: 4.3 G/DL — SIGNIFICANT CHANGE UP (ref 3.3–5)
ALP SERPL-CCNC: SIGNIFICANT CHANGE UP U/L (ref 40–120)
ALT FLD-CCNC: SIGNIFICANT CHANGE UP U/L (ref 10–45)
ANION GAP SERPL CALC-SCNC: 13 MMOL/L — SIGNIFICANT CHANGE UP (ref 5–17)
APTT BLD: 29.3 SEC — SIGNIFICANT CHANGE UP (ref 24.5–35.6)
AST SERPL-CCNC: SIGNIFICANT CHANGE UP U/L (ref 10–40)
BASOPHILS # BLD AUTO: 0.03 K/UL — SIGNIFICANT CHANGE UP (ref 0–0.2)
BASOPHILS NFR BLD AUTO: 0.3 % — SIGNIFICANT CHANGE UP (ref 0–2)
BILIRUB SERPL-MCNC: 0.4 MG/DL — SIGNIFICANT CHANGE UP (ref 0.2–1.2)
BUN SERPL-MCNC: 18 MG/DL — SIGNIFICANT CHANGE UP (ref 7–23)
CALCIUM SERPL-MCNC: 8.8 MG/DL — SIGNIFICANT CHANGE UP (ref 8.4–10.5)
CHLORIDE SERPL-SCNC: 102 MMOL/L — SIGNIFICANT CHANGE UP (ref 96–108)
CHOLEST SERPL-MCNC: 116 MG/DL — SIGNIFICANT CHANGE UP
CK MB CFR SERPL CALC: 2.9 NG/ML — SIGNIFICANT CHANGE UP (ref 0–6.7)
CK SERPL-CCNC: SIGNIFICANT CHANGE UP U/L (ref 30–200)
CO2 SERPL-SCNC: 23 MMOL/L — SIGNIFICANT CHANGE UP (ref 22–31)
CREAT SERPL-MCNC: 0.82 MG/DL — SIGNIFICANT CHANGE UP (ref 0.5–1.3)
EGFR: 94 ML/MIN/1.73M2 — SIGNIFICANT CHANGE UP
EOSINOPHIL # BLD AUTO: 0.27 K/UL — SIGNIFICANT CHANGE UP (ref 0–0.5)
EOSINOPHIL NFR BLD AUTO: 3.1 % — SIGNIFICANT CHANGE UP (ref 0–6)
ESTIMATED AVERAGE GLUCOSE: 143 MG/DL — HIGH (ref 68–114)
GLUCOSE SERPL-MCNC: 120 MG/DL — HIGH (ref 70–99)
HCT VFR BLD CALC: 40.7 % — SIGNIFICANT CHANGE UP (ref 39–50)
HDLC SERPL-MCNC: 36 MG/DL — LOW
HGB BLD-MCNC: 13.1 G/DL — SIGNIFICANT CHANGE UP (ref 13–17)
IMM GRANULOCYTES NFR BLD AUTO: 1.4 % — HIGH (ref 0–0.9)
INR BLD: 1.04 — SIGNIFICANT CHANGE UP (ref 0.85–1.16)
LIPID PNL WITH DIRECT LDL SERPL: 64 MG/DL — SIGNIFICANT CHANGE UP
LYMPHOCYTES # BLD AUTO: 1.36 K/UL — SIGNIFICANT CHANGE UP (ref 1–3.3)
LYMPHOCYTES # BLD AUTO: 15.7 % — SIGNIFICANT CHANGE UP (ref 13–44)
MAGNESIUM SERPL-MCNC: 2.1 MG/DL — SIGNIFICANT CHANGE UP (ref 1.6–2.6)
MCHC RBC-ENTMCNC: 28.7 PG — SIGNIFICANT CHANGE UP (ref 27–34)
MCHC RBC-ENTMCNC: 32.2 G/DL — SIGNIFICANT CHANGE UP (ref 32–36)
MCV RBC AUTO: 89.3 FL — SIGNIFICANT CHANGE UP (ref 80–100)
MONOCYTES # BLD AUTO: 0.57 K/UL — SIGNIFICANT CHANGE UP (ref 0–0.9)
MONOCYTES NFR BLD AUTO: 6.6 % — SIGNIFICANT CHANGE UP (ref 2–14)
NEUTROPHILS # BLD AUTO: 6.29 K/UL — SIGNIFICANT CHANGE UP (ref 1.8–7.4)
NEUTROPHILS NFR BLD AUTO: 72.9 % — SIGNIFICANT CHANGE UP (ref 43–77)
NON HDL CHOLESTEROL: 80 MG/DL — SIGNIFICANT CHANGE UP
NRBC BLD AUTO-RTO: 0 /100 WBCS — SIGNIFICANT CHANGE UP (ref 0–0)
PLATELET # BLD AUTO: 122 K/UL — LOW (ref 150–400)
POTASSIUM SERPL-MCNC: SIGNIFICANT CHANGE UP MMOL/L (ref 3.5–5.3)
POTASSIUM SERPL-SCNC: SIGNIFICANT CHANGE UP MMOL/L (ref 3.5–5.3)
PROT SERPL-MCNC: 7.8 G/DL — SIGNIFICANT CHANGE UP (ref 6–8.3)
PROTHROM AB SERPL-ACNC: 12.2 SEC — SIGNIFICANT CHANGE UP (ref 9.9–13.4)
RBC # BLD: 4.56 M/UL — SIGNIFICANT CHANGE UP (ref 4.2–5.8)
RBC # FLD: 14.7 % — HIGH (ref 10.3–14.5)
SODIUM SERPL-SCNC: 138 MMOL/L — SIGNIFICANT CHANGE UP (ref 135–145)
TRIGL SERPL-MCNC: 80 MG/DL — SIGNIFICANT CHANGE UP
WBC # BLD: 8.64 K/UL — SIGNIFICANT CHANGE UP (ref 3.8–10.5)
WBC # FLD AUTO: 8.64 K/UL — SIGNIFICANT CHANGE UP (ref 3.8–10.5)

## 2025-02-26 PROCEDURE — 93458 L HRT ARTERY/VENTRICLE ANGIO: CPT

## 2025-02-26 PROCEDURE — 85610 PROTHROMBIN TIME: CPT

## 2025-02-26 PROCEDURE — 83735 ASSAY OF MAGNESIUM: CPT

## 2025-02-26 PROCEDURE — 85730 THROMBOPLASTIN TIME PARTIAL: CPT

## 2025-02-26 PROCEDURE — 93458 L HRT ARTERY/VENTRICLE ANGIO: CPT | Mod: 26

## 2025-02-26 PROCEDURE — C1769: CPT

## 2025-02-26 PROCEDURE — 36415 COLL VENOUS BLD VENIPUNCTURE: CPT

## 2025-02-26 PROCEDURE — 82962 GLUCOSE BLOOD TEST: CPT

## 2025-02-26 PROCEDURE — 80061 LIPID PANEL: CPT

## 2025-02-26 PROCEDURE — C1894: CPT

## 2025-02-26 PROCEDURE — 85025 COMPLETE CBC W/AUTO DIFF WBC: CPT

## 2025-02-26 PROCEDURE — 99152 MOD SED SAME PHYS/QHP 5/>YRS: CPT

## 2025-02-26 PROCEDURE — C1887: CPT

## 2025-02-26 PROCEDURE — 82553 CREATINE MB FRACTION: CPT

## 2025-02-26 PROCEDURE — 82550 ASSAY OF CK (CPK): CPT

## 2025-02-26 PROCEDURE — 0523T NTRAPX C FFR W/3D FUNCJL MAP: CPT

## 2025-02-26 PROCEDURE — 80053 COMPREHEN METABOLIC PANEL: CPT

## 2025-02-26 PROCEDURE — 83036 HEMOGLOBIN GLYCOSYLATED A1C: CPT

## 2025-02-26 RX ORDER — IPRATROPIUM BROMIDE AND ALBUTEROL SULFATE .5; 2.5 MG/3ML; MG/3ML
3 SOLUTION RESPIRATORY (INHALATION)
Refills: 0 | DISCHARGE

## 2025-02-26 RX ORDER — LEVOTHYROXINE SODIUM 300 MCG
1 TABLET ORAL
Refills: 0 | DISCHARGE

## 2025-02-26 RX ORDER — BUDESONIDE AND FORMOTEROL FUMARATE DIHYDRATE 80; 4.5 UG/1; UG/1
2 AEROSOL RESPIRATORY (INHALATION)
Refills: 0 | DISCHARGE

## 2025-02-26 RX ORDER — SODIUM CHLORIDE 9 G/1000ML
1000 INJECTION, SOLUTION INTRAVENOUS
Refills: 0 | Status: DISCONTINUED | OUTPATIENT
Start: 2025-02-26 | End: 2025-03-12

## 2025-02-26 RX ORDER — DEXTROSE 50 % IN WATER 50 %
12.5 SYRINGE (ML) INTRAVENOUS ONCE
Refills: 0 | Status: DISCONTINUED | OUTPATIENT
Start: 2025-02-26 | End: 2025-03-12

## 2025-02-26 RX ORDER — ALBUTEROL SULFATE 2.5 MG/3ML
2 VIAL, NEBULIZER (ML) INHALATION
Refills: 0 | DISCHARGE

## 2025-02-26 RX ORDER — DEXTROSE 50 % IN WATER 50 %
15 SYRINGE (ML) INTRAVENOUS ONCE
Refills: 0 | Status: DISCONTINUED | OUTPATIENT
Start: 2025-02-26 | End: 2025-03-12

## 2025-02-26 RX ORDER — FLUTICASONE FUROATE, UMECLIDINIUM BROMIDE AND VILANTEROL TRIFENATATE 100; 62.5; 25 UG/1; UG/1; UG/1
1 POWDER RESPIRATORY (INHALATION)
Refills: 0 | DISCHARGE

## 2025-02-26 RX ORDER — CEPHALEXIN 250 MG/1
1 CAPSULE ORAL
Refills: 0 | DISCHARGE

## 2025-02-26 RX ORDER — ASPIRIN 325 MG
1 TABLET ORAL
Refills: 0 | DISCHARGE

## 2025-02-26 RX ORDER — TAMSULOSIN HYDROCHLORIDE 0.4 MG/1
1 CAPSULE ORAL
Refills: 0 | DISCHARGE

## 2025-02-26 RX ORDER — MONTELUKAST SODIUM 10 MG/1
1 TABLET ORAL
Refills: 0 | DISCHARGE

## 2025-02-26 RX ORDER — METOPROLOL SUCCINATE 50 MG/1
1 TABLET, EXTENDED RELEASE ORAL
Refills: 0 | DISCHARGE

## 2025-02-26 RX ORDER — LOSARTAN POTASSIUM 100 MG/1
1 TABLET, FILM COATED ORAL
Refills: 0 | DISCHARGE

## 2025-02-26 RX ORDER — FUROSEMIDE 10 MG/ML
1 INJECTION INTRAMUSCULAR; INTRAVENOUS
Refills: 0 | DISCHARGE

## 2025-02-26 RX ORDER — DEXTROSE 50 % IN WATER 50 %
25 SYRINGE (ML) INTRAVENOUS ONCE
Refills: 0 | Status: DISCONTINUED | OUTPATIENT
Start: 2025-02-26 | End: 2025-03-12

## 2025-02-26 RX ORDER — CLOPIDOGREL BISULFATE 75 MG/1
1 TABLET, FILM COATED ORAL
Refills: 0 | DISCHARGE

## 2025-02-26 RX ORDER — GLUCAGON 3 MG/1
1 POWDER NASAL ONCE
Refills: 0 | Status: DISCONTINUED | OUTPATIENT
Start: 2025-02-26 | End: 2025-03-12

## 2025-02-26 RX ORDER — BENZONATATE 100 MG
1 CAPSULE ORAL
Refills: 0 | DISCHARGE

## 2025-02-26 RX ORDER — ROSUVASTATIN CALCIUM 20 MG/1
1 TABLET, FILM COATED ORAL
Refills: 0 | DISCHARGE

## 2025-02-26 RX ADMIN — Medication 200 MILLILITER(S): at 13:12

## 2025-02-26 RX ADMIN — Medication 75 MILLILITER(S): at 09:52

## 2025-02-26 RX ADMIN — Medication 1000 MILLILITER(S): at 09:52

## 2025-02-26 NOTE — PROGRESS NOTE ADULT - SUBJECTIVE AND OBJECTIVE BOX
Interventional Cardiology PA SDA Discharge Note    Patient without complaints. Ambulated and voided without difficulties  Afebrile, VSS  Ext: Right Radial : no hematoma, no bleeding, dressing; C/D/I  Pulses:    intact RAD to baseline     A/P:    s/p diagnostic LHC (2/26/25): LM normal, pLAD 40-50% (ectatic; FFR 0.96), LCx mild diffuse (small vessel), mRCA 40-50%, rPDA 50% (FFR 0.78, small vessel); LVEFP 7mmHg.   ACCESS: right ulnar artery  IVF: NS 200cc/hr x2hr    1.	Stable for discharge as per attending Dr. Hartmann after bed rest, pt voids, groin/wrist stable and 30 minutes of ambulation.  2.	Follow-up with PMD/Cardiologist Dr. Kapadia in 1-2 weeks  3.	Discharged forms signed and copies in chart

## 2025-02-27 DIAGNOSIS — I25.110 ATHEROSCLEROTIC HEART DISEASE OF NATIVE CORONARY ARTERY WITH UNSTABLE ANGINA PECTORIS: ICD-10-CM

## 2025-02-27 DIAGNOSIS — R93.1 ABNORMAL FINDINGS ON DIAGNOSTIC IMAGING OF HEART AND CORONARY CIRCULATION: ICD-10-CM

## 2025-02-27 DIAGNOSIS — R94.39 ABNORMAL RESULT OF OTHER CARDIOVASCULAR FUNCTION STUDY: ICD-10-CM

## 2025-03-14 ENCOUNTER — APPOINTMENT (OUTPATIENT)
Dept: PULMONOLOGY | Facility: CLINIC | Age: 70
End: 2025-03-14
Payer: MEDICARE

## 2025-03-14 VITALS
HEART RATE: 81 BPM | HEIGHT: 67 IN | OXYGEN SATURATION: 94 % | RESPIRATION RATE: 14 BRPM | BODY MASS INDEX: 30.13 KG/M2 | DIASTOLIC BLOOD PRESSURE: 62 MMHG | WEIGHT: 192 LBS | SYSTOLIC BLOOD PRESSURE: 102 MMHG

## 2025-03-14 DIAGNOSIS — G47.33 OBSTRUCTIVE SLEEP APNEA (ADULT) (PEDIATRIC): ICD-10-CM

## 2025-03-14 DIAGNOSIS — J45.909 UNSPECIFIED ASTHMA, UNCOMPLICATED: ICD-10-CM

## 2025-03-14 DIAGNOSIS — R05.3 CHRONIC COUGH: ICD-10-CM

## 2025-03-14 DIAGNOSIS — E66.9 OBESITY, UNSPECIFIED: ICD-10-CM

## 2025-03-14 PROBLEM — E78.5 HYPERLIPIDEMIA, UNSPECIFIED: Chronic | Status: ACTIVE | Noted: 2025-02-24

## 2025-03-14 PROBLEM — E11.9 TYPE 2 DIABETES MELLITUS WITHOUT COMPLICATIONS: Chronic | Status: ACTIVE | Noted: 2025-02-24

## 2025-03-14 PROBLEM — E03.9 HYPOTHYROIDISM, UNSPECIFIED: Chronic | Status: ACTIVE | Noted: 2025-02-24

## 2025-03-14 PROBLEM — I10 ESSENTIAL (PRIMARY) HYPERTENSION: Chronic | Status: ACTIVE | Noted: 2025-02-24

## 2025-03-14 PROCEDURE — G2211 COMPLEX E/M VISIT ADD ON: CPT

## 2025-03-14 PROCEDURE — 99214 OFFICE O/P EST MOD 30 MIN: CPT

## 2025-03-14 RX ORDER — FLUTICASONE FUROATE, UMECLIDINIUM BROMIDE AND VILANTEROL TRIFENATATE 100; 62.5; 25 UG/1; UG/1; UG/1
100-62.5-25 POWDER RESPIRATORY (INHALATION)
Qty: 60 | Refills: 2 | Status: ACTIVE | COMMUNITY
Start: 2025-03-14 | End: 1900-01-01

## 2025-03-14 RX ORDER — AMOXICILLIN AND CLAVULANATE POTASSIUM 875; 125 MG/1; MG/1
875-125 TABLET, COATED ORAL
Qty: 14 | Refills: 0 | Status: ACTIVE | COMMUNITY
Start: 2025-03-14 | End: 1900-01-01

## 2025-04-18 ENCOUNTER — APPOINTMENT (OUTPATIENT)
Dept: PAIN MANAGEMENT | Facility: CLINIC | Age: 70
End: 2025-04-18
Payer: OTHER MISCELLANEOUS

## 2025-04-18 DIAGNOSIS — M54.2 CERVICALGIA: ICD-10-CM

## 2025-04-18 DIAGNOSIS — M54.50 LOW BACK PAIN, UNSPECIFIED: ICD-10-CM

## 2025-04-18 PROCEDURE — 99213 OFFICE O/P EST LOW 20 MIN: CPT | Mod: ACP

## 2025-05-12 ENCOUNTER — NON-APPOINTMENT (OUTPATIENT)
Age: 70
End: 2025-05-12

## 2025-06-03 ENCOUNTER — APPOINTMENT (OUTPATIENT)
Dept: PULMONOLOGY | Facility: CLINIC | Age: 70
End: 2025-06-03
Payer: MEDICARE

## 2025-06-03 VITALS
HEART RATE: 98 BPM | HEIGHT: 67 IN | RESPIRATION RATE: 14 BRPM | WEIGHT: 200 LBS | SYSTOLIC BLOOD PRESSURE: 122 MMHG | OXYGEN SATURATION: 98 % | BODY MASS INDEX: 31.39 KG/M2 | DIASTOLIC BLOOD PRESSURE: 72 MMHG

## 2025-06-03 DIAGNOSIS — G47.33 OBSTRUCTIVE SLEEP APNEA (ADULT) (PEDIATRIC): ICD-10-CM

## 2025-06-03 DIAGNOSIS — R05.3 CHRONIC COUGH: ICD-10-CM

## 2025-06-03 DIAGNOSIS — A31.9 MYCOBACTERIAL INFECTION, UNSPECIFIED: ICD-10-CM

## 2025-06-03 PROCEDURE — 99214 OFFICE O/P EST MOD 30 MIN: CPT

## 2025-06-03 PROCEDURE — G2211 COMPLEX E/M VISIT ADD ON: CPT

## 2025-06-09 ENCOUNTER — RX RENEWAL (OUTPATIENT)
Age: 70
End: 2025-06-09

## 2025-06-12 LAB — ACID FAST STN SPT: NORMAL

## 2025-07-22 ENCOUNTER — APPOINTMENT (OUTPATIENT)
Dept: PAIN MANAGEMENT | Facility: CLINIC | Age: 70
End: 2025-07-22

## 2025-08-01 ENCOUNTER — APPOINTMENT (OUTPATIENT)
Dept: PAIN MANAGEMENT | Facility: CLINIC | Age: 70
End: 2025-08-01
Payer: OTHER MISCELLANEOUS

## 2025-08-01 DIAGNOSIS — M54.50 LOW BACK PAIN, UNSPECIFIED: ICD-10-CM

## 2025-08-01 DIAGNOSIS — M54.2 CERVICALGIA: ICD-10-CM

## 2025-08-01 PROCEDURE — 99214 OFFICE O/P EST MOD 30 MIN: CPT | Mod: ACP

## 2025-08-11 ENCOUNTER — APPOINTMENT (OUTPATIENT)
Dept: PULMONOLOGY | Facility: CLINIC | Age: 70
End: 2025-08-11
Payer: MEDICARE

## 2025-08-11 VITALS
RESPIRATION RATE: 15 BRPM | HEIGHT: 67 IN | OXYGEN SATURATION: 95 % | WEIGHT: 200 LBS | BODY MASS INDEX: 31.39 KG/M2 | DIASTOLIC BLOOD PRESSURE: 76 MMHG | HEART RATE: 94 BPM | SYSTOLIC BLOOD PRESSURE: 118 MMHG

## 2025-08-11 DIAGNOSIS — G47.33 OBSTRUCTIVE SLEEP APNEA (ADULT) (PEDIATRIC): ICD-10-CM

## 2025-08-11 DIAGNOSIS — A31.9 MYCOBACTERIAL INFECTION, UNSPECIFIED: ICD-10-CM

## 2025-08-11 DIAGNOSIS — J45.909 UNSPECIFIED ASTHMA, UNCOMPLICATED: ICD-10-CM

## 2025-08-11 DIAGNOSIS — R05.3 CHRONIC COUGH: ICD-10-CM

## 2025-08-11 LAB
BASOPHILS # BLD AUTO: 0.04 K/UL
BASOPHILS NFR BLD AUTO: 0.4 %
EOSINOPHIL # BLD AUTO: 0.19 K/UL
EOSINOPHIL NFR BLD AUTO: 1.8 %
HCT VFR BLD CALC: 40.8 %
HGB BLD-MCNC: 13 G/DL
IMM GRANULOCYTES NFR BLD AUTO: 1.5 %
LYMPHOCYTES # BLD AUTO: 1.27 K/UL
LYMPHOCYTES NFR BLD AUTO: 12 %
MAN DIFF?: NORMAL
MCHC RBC-ENTMCNC: 28.5 PG
MCHC RBC-ENTMCNC: 31.9 G/DL
MCV RBC AUTO: 89.5 FL
MONOCYTES # BLD AUTO: 0.71 K/UL
MONOCYTES NFR BLD AUTO: 6.7 %
NEUTROPHILS # BLD AUTO: 8.21 K/UL
NEUTROPHILS NFR BLD AUTO: 77.6 %
PLATELET # BLD AUTO: 183 K/UL
PMV BLD AUTO: 0 /100 WBCS
PMV BLD: 12.3 FL
RBC # BLD: 4.56 M/UL
RBC # FLD: 14.9 %
WBC # FLD AUTO: 10.58 K/UL

## 2025-08-11 PROCEDURE — 99214 OFFICE O/P EST MOD 30 MIN: CPT

## 2025-08-11 PROCEDURE — G2211 COMPLEX E/M VISIT ADD ON: CPT

## 2025-08-11 RX ORDER — BUDESONIDE AND FORMOTEROL FUMARATE DIHYDRATE 160; 4.5 UG/1; UG/1
160-4.5 AEROSOL RESPIRATORY (INHALATION) TWICE DAILY
Qty: 1 | Refills: 2 | Status: ACTIVE | COMMUNITY
Start: 2025-08-11 | End: 1900-01-01

## 2025-08-11 RX ORDER — ALBUTEROL SULFATE 2.5 MG/3ML
(2.5 MG/3ML) SOLUTION RESPIRATORY (INHALATION)
Qty: 1 | Refills: 3 | Status: ACTIVE | COMMUNITY
Start: 2025-08-11 | End: 1900-01-01

## 2025-08-11 RX ORDER — UMECLIDINIUM 62.5 UG/1
62.5 AEROSOL, POWDER ORAL
Qty: 1 | Refills: 2 | Status: ACTIVE | COMMUNITY
Start: 2025-08-11 | End: 1900-01-01

## 2025-08-23 LAB — ACID FAST STN SPT: NORMAL
